# Patient Record
Sex: MALE | Race: WHITE
[De-identification: names, ages, dates, MRNs, and addresses within clinical notes are randomized per-mention and may not be internally consistent; named-entity substitution may affect disease eponyms.]

---

## 2018-01-02 ENCOUNTER — HOSPITAL ENCOUNTER (INPATIENT)
Dept: HOSPITAL 12 - SRC | Age: 18
LOS: 7 days | Discharge: HOME | DRG: 895 | End: 2018-01-09
Attending: INTERNAL MEDICINE | Admitting: INTERNAL MEDICINE
Payer: COMMERCIAL

## 2018-01-02 VITALS — HEIGHT: 73 IN | WEIGHT: 150 LBS | BODY MASS INDEX: 19.88 KG/M2

## 2018-01-02 VITALS — SYSTOLIC BLOOD PRESSURE: 137 MMHG | DIASTOLIC BLOOD PRESSURE: 75 MMHG

## 2018-01-02 DIAGNOSIS — F12.10: ICD-10-CM

## 2018-01-02 DIAGNOSIS — Z81.3: ICD-10-CM

## 2018-01-02 DIAGNOSIS — F17.210: ICD-10-CM

## 2018-01-02 DIAGNOSIS — Z91.89: ICD-10-CM

## 2018-01-02 DIAGNOSIS — F10.230: Primary | ICD-10-CM

## 2018-01-02 DIAGNOSIS — F15.10: ICD-10-CM

## 2018-01-02 DIAGNOSIS — F11.23: ICD-10-CM
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
## 2018-01-02 LAB
AMPHETAMINES UR QL SCN>1000 NG/ML: POSITIVE
BARBITURATES UR QL SCN: NEGATIVE
COCAINE UR QL SCN: NEGATIVE
OPIATES UR QL SCN: POSITIVE
PCP UR QL SCN>25 NG/ML: NEGATIVE
THC UR QL SCN>50 NG/ML: NEGATIVE

## 2018-01-02 PROCEDURE — HZ2ZZZZ DETOXIFICATION SERVICES FOR SUBSTANCE ABUSE TREATMENT: ICD-10-PCS | Performed by: INTERNAL MEDICINE

## 2018-01-02 PROCEDURE — A4663 DIALYSIS BLOOD PRESSURE CUFF: HCPCS

## 2018-01-02 PROCEDURE — G0480 DRUG TEST DEF 1-7 CLASSES: HCPCS

## 2018-01-02 NOTE — NUR
PRE-ADMISSION NOTE:

Patient assessed in intake office at 2100 on 01/02/2018.  Patient is ambulatory with steady 
gate, stable, AOx4, speech is soft and clear.  Patient states that he is "first time for 
Safety Detox from Alcohol, Opioid, and Methamphetamine".   Patient states that he" last used 
Alcohol/Vodka PO - 750 ml today, on 01/02/2018 @1700 ;Heroin via IV - 7 gram on 01/02/2018 
@1900, and Methamphetamine smoke - 0.5 grams today, on 01/02/2018 @1900".  COWS 9,CIWA 7.  
The patient reported the following symptoms of withdrawal: anxiety, agitation, nervousness, 
tremors, body  aches, diaphoresis, abdominal cramps, headache,  restless legs,  insomnia and 
 fatigue.  Patient denies SI/HI.  Breathing is even and unlabored.  Patient denies SOB and 
chest pain.  VS: T: 97.9; BP: 137/75; HR: 80; RR: 18; O2 SAT: 97%. Patient reports 
generalized body aches pain now: "6/10".  Patient reports NKA.  Patient placed on Full Code, 
Regular Diet, Fall and Seizures Precautions.  Patient instructed on unit protocol of vitals 
Q4H and COWS/CIWA assessments. Patient verbalized understanding and agreement. Patient also 
instructed on policy regarding destruction of any controlled  substances/prescriptions 
brought to facility, and handling of all medications.  Patient verbalized understanding and 
agreement.  Will complete admission assessment when patient is brought up to unit.

## 2018-01-02 NOTE — NUR
ADMISSION NOTE

New  patient is a 27 year old male admitted to [facility]  on 01/02/2018 @2110 
for medically supervised withdrawal from  Alcohol, Opioid, and Methamphetamine.  Patient 
reports NKA.  Patient placed on Full Code, Regular Diet, Fall and Seizures Precautions.  
Patient denies Seizures History.  Patient reports "no PCP" .  Pre-assessment completed in 
intake.  Patient provided UDS test at this time.  Patient is ambulatory with steady gate, 
stable, A&Ox4, speech is soft clear.  VS upon admission: T: 97.9; BP: 137/75; HR: 80; RR: 
18; O2 SAT: 97%. Patient reports generalized body aches pain now: "6/10".   Ht: 73 in; Wt: 
150 lb.  COWS 9, CIWA 7.  The patient presented with: anxiety, agitation, nervousness, 
tremors, sweating, headache, body aches, restless legs, insomnia and  fatigue.  Patient 
denies SI/HI.  Patient is cooperative and verbally appropriate.  Patient reports  the 
following substances use:

1."Alcohol: Vodka 750 ml five times a week since 2015.  Last used Alcohol/Vodka  ml 
today, on 01/02/2018 @1700  ".  

2."Heroin via IV  more than 7 grams every day grams every day since 2015.  Last dose taken  
- 7 gram today, on 01/02/2018 @1900".

3." Methamphetamine smoke 0.5 grams two times a week since 2015.  Last dosage  - 0.5 grams 
today, on 01/02/2018 @1900".  

Patient reports "smoking 20 cigarettes every day since 2000".  Written smoking cessation 
education provided.  Patient verbalized understanding.  Patient reports recently Patient 
states that he is "first  time for Safety Detox from Alcohol, Opioid, and Methamphetamine".  
 Patient reports that longest period of sobriety was from 2013 for 1,5 years to  2015".  
Patient denies a history of suicidal ideations.  Patient denies current  SI/HI.  Past 
Medical History: Anxiety, Depression.  Patient did not brought Home Medications.  Upon 
initial assessment, patient's Respirations unlabored and even.  Patient denies SOB and chest 
pain.  Lungs Sounds are clear bilaterally.  Bowel Sounds active in all x4 quadrants. Abdomen 
is soft and non-tender.  PERRLA,  brisk capillary refill,  equal and strong.  Skin is 
intact, warm and dry to touch.  Patient oriented to his room, Nurse Call Light, and Serenity 
Floor.  Encourage fluids as tolerated.  Encourage to attend activities groups.  All needs 
met.  Safety measures on place.  Call light within reach, bed in lowest position and locked, 
padded rails up bilaterally rails up bilaterally.  Patient admitted under the care doctor 
Juan Carlos Grimes MD.  Doctor Juan Carlos Grimes MD aware for patient condition.  Will 
continue to monitor closely.

## 2018-01-03 VITALS — SYSTOLIC BLOOD PRESSURE: 116 MMHG | DIASTOLIC BLOOD PRESSURE: 74 MMHG

## 2018-01-03 VITALS — SYSTOLIC BLOOD PRESSURE: 117 MMHG | DIASTOLIC BLOOD PRESSURE: 51 MMHG

## 2018-01-03 VITALS — DIASTOLIC BLOOD PRESSURE: 53 MMHG | SYSTOLIC BLOOD PRESSURE: 124 MMHG

## 2018-01-03 VITALS — DIASTOLIC BLOOD PRESSURE: 66 MMHG | SYSTOLIC BLOOD PRESSURE: 125 MMHG

## 2018-01-03 VITALS — DIASTOLIC BLOOD PRESSURE: 79 MMHG | SYSTOLIC BLOOD PRESSURE: 128 MMHG

## 2018-01-03 VITALS — SYSTOLIC BLOOD PRESSURE: 112 MMHG | DIASTOLIC BLOOD PRESSURE: 59 MMHG

## 2018-01-03 LAB
ALP SERPL-CCNC: 68 U/L (ref 50–136)
ALT SERPL W/O P-5'-P-CCNC: 25 U/L (ref 16–63)
AMYLASE SERPL-CCNC: 59 U/L (ref 25–115)
AST SERPL-CCNC: 18 U/L (ref 15–37)
BASOPHILS # BLD AUTO: 0 K/UL (ref 0–8)
BASOPHILS NFR BLD AUTO: 0.6 % (ref 0–2)
BILIRUB SERPL-MCNC: 0.7 MG/DL (ref 0.2–1)
BUN SERPL-MCNC: 10 MG/DL (ref 7–18)
CHLORIDE SERPL-SCNC: 106 MMOL/L (ref 98–107)
CO2 SERPL-SCNC: 31 MMOL/L (ref 21–32)
CREAT SERPL-MCNC: 0.8 MG/DL (ref 0.6–1.3)
EOSINOPHIL # BLD AUTO: 0.1 K/UL (ref 0–0.7)
EOSINOPHIL NFR BLD AUTO: 2.7 % (ref 0–7)
ETHANOL SERPL-MCNC: < 3 MG/DL (ref 0–0)
GLUCOSE SERPL-MCNC: 85 MG/DL (ref 74–106)
HCT VFR BLD AUTO: 37.7 % (ref 36.7–47.1)
HGB BLD-MCNC: 13.1 G/DL (ref 12.5–16.3)
LYMPHOCYTES # BLD AUTO: 1.7 K/UL (ref 20–40)
LYMPHOCYTES NFR BLD AUTO: 39.3 % (ref 20.5–51.5)
MAGNESIUM SERPL-MCNC: 1.8 MG/DL (ref 1.8–2.4)
MCH RBC QN AUTO: 28.9 UUG (ref 23.8–33.4)
MCHC RBC AUTO-ENTMCNC: 35 G/DL (ref 32.5–36.3)
MCV RBC AUTO: 83.2 FL (ref 73–96.2)
MONOCYTES # BLD AUTO: 0.3 K/UL (ref 2–10)
MONOCYTES NFR BLD AUTO: 8.1 % (ref 0–11)
NEUTROPHILS # BLD AUTO: 2.1 K/UL (ref 1.8–8.9)
NEUTROPHILS NFR BLD AUTO: 49.3 % (ref 38.5–71.5)
PLATELET # BLD AUTO: 256 K/UL (ref 152–348)
POTASSIUM SERPL-SCNC: 3.7 MMOL/L (ref 3.5–5.1)
RBC # BLD AUTO: 4.53 MIL/UL (ref 4.06–5.63)
WBC # BLD AUTO: 4.3 K/UL (ref 3.6–10.2)
WS STN SPEC: 7.9 G/DL (ref 6.4–8.2)

## 2018-01-03 RX ADMIN — BUPRENORPHINE HYDROCHLORIDE SCH MG: 2 TABLET SUBLINGUAL at 17:13

## 2018-01-03 RX ADMIN — LORAZEPAM SCH MG: 1 TABLET ORAL at 17:12

## 2018-01-03 RX ADMIN — FOLIC ACID SCH MG: 1 TABLET ORAL at 08:44

## 2018-01-03 RX ADMIN — LORAZEPAM SCH MG: 1 TABLET ORAL at 12:45

## 2018-01-03 RX ADMIN — BUPRENORPHINE HYDROCHLORIDE SCH MG: 2 TABLET SUBLINGUAL at 08:44

## 2018-01-03 RX ADMIN — BUPRENORPHINE HYDROCHLORIDE SCH MG: 2 TABLET SUBLINGUAL at 20:15

## 2018-01-03 RX ADMIN — LORAZEPAM SCH MG: 1 TABLET ORAL at 08:44

## 2018-01-03 RX ADMIN — Medication SCH MG: at 08:45

## 2018-01-03 RX ADMIN — LORAZEPAM SCH MG: 1 TABLET ORAL at 20:15

## 2018-01-03 RX ADMIN — BUPRENORPHINE HYDROCHLORIDE SCH MG: 2 TABLET SUBLINGUAL at 12:46

## 2018-01-03 RX ADMIN — THERA TABS SCH UDTAB: TAB at 08:44

## 2018-01-03 RX ADMIN — GABAPENTIN SCH MG: 300 CAPSULE ORAL at 20:15

## 2018-01-03 NOTE — NUR
RE-ASSESSMENT

Patient is sleeping.  Respirations even and unlabored.  RR 15.  PRN Benadryl 50 mg 1 capsule 
PO administrated for insomnia @2014 was effective.  All needs met. Safety measures on place. 
Call light within reach, bed in lowest position and locked, bed rails up bilaterally.  Will 
continue to monitor closely.

## 2018-01-03 NOTE — NUR
Received pt in bed sleeping, no s/s of distress, sleep 8 hours, cow 8, and ciwa 5, will 
check on him later.

## 2018-01-03 NOTE — NUR
END OF SHIFT NOTE:

Patient is a 27 year old male admitted for medically supervised withdrawal from  Alcohol, 
Opioid, and Methamphetamine.  5 day Ativan and 5 Day Subutex Taper will starting today, 
01/03/2018 @0900, as ordered.  Patient reports NKA, is on Full Code, Regular Diet, Fall and 
Seizures Precautions.  Patients denies Seizures Hx  r/t withdrawal from substances.  Past 
Medical History: Anxiety, Depression.  Last  COWS 8, CIWA 5 @0400.  Patient c/o anxiety, 
agitation, nervousness, tremors, sweating, body aches, restless legs,  insomnia and  
fatigue.  Patient denies SI/HI.  Last VS @0400: T: 98.3, HR: 65, RR: 16 , RA O2SAT 100%, BP: 
116/74, pain level: "0/10".  Respirations unlabored and even.  Skin is intact, warm and dry 
to touch.  Encourage fluids as tolerated.  Encourage to attend activities groups.  No PRN 
Medications administrated during my shift.  Patient slept 8 hours, intake 200 ml, voided x1. 
 All needs met.  Safety measures on place.  Call light within reach, bed in lowest position 
and locked, padded rails up bilaterally.  Patient endorsed to day shift nurse.  Report 
given.

## 2018-01-03 NOTE — NUR
Pt in room/bed, awake,alert, here for alcohol, heroin, and methamphetamine use, no c/o 
discomfrot at this time, complient with meds, encourage pos fluids, also to attend groups, 
no distress.

## 2018-01-03 NOTE — NUR
START OF SHIFT NOTE:

Patient is a 27 year old male admitted for medically supervised withdrawal from Alcohol, 
Opioid, and Methamphetamine, continues  5 Day Ativan and 5 Day Subutex taper.  He is 
tolerated well without ASE.  Patient remains compliant with medications and diet regime. 
Patient reports NKA, is on Full Code, Regular Diet, Fall and Seizures Precautions.  Patients 
denies Seizures Hx.  Patient is alert and oriented x4.  COWS 9, CIWA 7. Patient c/o anxiety, 
agitation, nervousness, tremors, sweating, body aches, restless legs, insomnia and  fatigue. 
 Patient denies SI/HI.  VSWNL.  Respirations are unlabored and even.  Lungs Sounds are clear 
throughout.   Patient denies cough, SOB and chest pain.  Heart rate is regular.  Abdomen is 
soft and non-tender.  Bowel Sounds are active in all four quadrants.  Skin is  intact, warm 
and dry to touch.  Encouraged fluids as tolerated. Encouraged to attend group activities.  
Safety measures in place: Call light within reach, bed is locked and lowest positions, 
padded bed rails up x2.  Patient endorsed by day shift nurse.  Report received.  Will 
continue to monitor closely.

## 2018-01-03 NOTE — NUR
PRN BENADRYL 50 MG 1 CAPSULE PO ADMINISTRATION

PRN Benadryl 50 mg 1 capsule PO administrated for insomnia with full glass of water as 
ordered.  Patient tolerated well.  All needs met.  Safety measures on place.  Call light 
within reach, bed in lowest position and locked, padded rails up x2.  Will continue to 
monitor closely.

## 2018-01-04 VITALS — SYSTOLIC BLOOD PRESSURE: 116 MMHG | DIASTOLIC BLOOD PRESSURE: 75 MMHG

## 2018-01-04 VITALS — DIASTOLIC BLOOD PRESSURE: 55 MMHG | SYSTOLIC BLOOD PRESSURE: 118 MMHG

## 2018-01-04 VITALS — SYSTOLIC BLOOD PRESSURE: 125 MMHG | DIASTOLIC BLOOD PRESSURE: 60 MMHG

## 2018-01-04 VITALS — SYSTOLIC BLOOD PRESSURE: 105 MMHG | DIASTOLIC BLOOD PRESSURE: 54 MMHG

## 2018-01-04 VITALS — SYSTOLIC BLOOD PRESSURE: 119 MMHG | DIASTOLIC BLOOD PRESSURE: 55 MMHG

## 2018-01-04 VITALS — SYSTOLIC BLOOD PRESSURE: 125 MMHG | DIASTOLIC BLOOD PRESSURE: 66 MMHG

## 2018-01-04 PROCEDURE — HZ31ZZZ INDIVIDUAL COUNSELING FOR SUBSTANCE ABUSE TREATMENT, BEHAVIORAL: ICD-10-PCS

## 2018-01-04 RX ADMIN — Medication SCH MG: at 08:58

## 2018-01-04 RX ADMIN — LORAZEPAM SCH MG: 1 TABLET ORAL at 15:14

## 2018-01-04 RX ADMIN — BUPRENORPHINE HYDROCHLORIDE SCH MG: 2 TABLET SUBLINGUAL at 08:58

## 2018-01-04 RX ADMIN — LORAZEPAM SCH MG: 1 TABLET ORAL at 20:54

## 2018-01-04 RX ADMIN — BACLOFEN SCH MG: 10 TABLET ORAL at 15:14

## 2018-01-04 RX ADMIN — BUPRENORPHINE HYDROCHLORIDE SCH MG: 2 TABLET SUBLINGUAL at 15:14

## 2018-01-04 RX ADMIN — CLONIDINE HYDROCHLORIDE SCH MG: 0.1 TABLET ORAL at 20:54

## 2018-01-04 RX ADMIN — GABAPENTIN SCH MG: 300 CAPSULE ORAL at 20:54

## 2018-01-04 RX ADMIN — GABAPENTIN SCH MG: 300 CAPSULE ORAL at 08:58

## 2018-01-04 RX ADMIN — THERA TABS SCH UDTAB: TAB at 08:58

## 2018-01-04 RX ADMIN — BACLOFEN SCH MG: 10 TABLET ORAL at 20:54

## 2018-01-04 RX ADMIN — NAPROXEN SCH MG: 500 TABLET ORAL at 20:54

## 2018-01-04 RX ADMIN — BUPRENORPHINE HYDROCHLORIDE SCH MG: 2 TABLET SUBLINGUAL at 20:54

## 2018-01-04 RX ADMIN — GABAPENTIN SCH MG: 300 CAPSULE ORAL at 15:14

## 2018-01-04 RX ADMIN — FOLIC ACID SCH MG: 1 TABLET ORAL at 08:58

## 2018-01-04 RX ADMIN — LORAZEPAM SCH MG: 1 TABLET ORAL at 08:58

## 2018-01-04 NOTE — NUR
START OF SHIFT



Pt is a 18 y/o male admitted on 01/02/17 for ETOH, opiate and meth dependence. Pt was 
dependent on vodka, heroin IV and meth. Pt is full code, regular diet, NKA and on 
fall/seizure precautions. Pt reports PMH of anxiety and depression, denies hx of seizures. 
Pt is on a 5 day Ativan and 5 day Subutex taper that started on 01/03/17, tolerating well. 
Upon assessment pt laying in bed with eyes closed. Upon fully awakening, pt presents with 
anxiety, body aches, intermittent headache, agitation, sweats, fatigue, difficulty sleeping, 
decreased appetite, dysphoria and anhedonia. Respirations even and unlabored. Denies N/V/D. 
Denies A/V hallucinations. Denies chest pain or SOB. Medications due. Safety measures in 
place. Call light within reach. Will continue to monitor.

-------------------------------------------------------------------------------

Addendum: 01/05/18 at 0730 by ESEQUIEL CONCEPCION RN

-------------------------------------------------------------------------------

admitted 01/02/18, taper started 01/03/18**

## 2018-01-04 NOTE — NUR
PRN BENADRYL ADMINISTRATION



Pt requests sleep aid. Safety measures in place. Call light within reach. Will continue to 
monitor.

## 2018-01-04 NOTE — NUR
END OF SHIFT NOTE:

Patient is a 27 year old male admitted for Alcohol, Opioid, and Methamphetamine dependence, 
continues  5 day Ativan and 5 Day Subutex Taper which tolerated well without ASE.  Patient 
reports NKA, is on Full Code, Regular Diet, Fall and Seizures Precautions.  Patients denies 
Seizures Hx.  Last  COWS 9, CIWA 5 @0400.  Patient c/o anxiety, agitation, nervousness, 
tremors, sweating, body aches, restless legs,  insomnia and  fatigue.  Patient denies SI/HI. 
 Last VS @0400: T: 98.1, HR: 86, RR: 16, RA O2SAT 97%, BP: 105/54, pain level: "8/10".  
Respirations unlabored and even.  Skin is intact, warm and dry to touch.  Encourage fluids 
as tolerated.  Encourage to attend activities groups.  PRN Benadryl 50 mg 1 capsule PO 
administrated for insomnia @2014 was effective.   Patient slept 9 hours, intake 1,184 ml, 
voided x2.   All needs met.  Safety measures on place.  Call light within reach, bed in 
lowest position and locked, padded rails up bilaterally.  Patient endorsed to day shift 
nurse.  Report given.

## 2018-01-04 NOTE — NUR
Endorsement received from nightshift nurse. Pt is a 18 y/o male admitted for Heroin and 
Alcohol dependence. Pt has been placed on a 5 day Ativan and 5 day Subutex taper. PT is 
tolerating both tapers and moderately withdrawing AEB CIWA 5, COWS 5 at 1600. Pt did not 
receive any PRN medications. Educated pt on diet and medication regimen. VS WNL. Full Code. 
PT is alert and oriented x4. Pt is in STABLE condition at this time. Remains compliant with 
medication and diet regimen. All needs have been met, All safety measures in place per 
hospital policy. Bed in lowest position, side rails up x2, call-light within reach. Will 
continue to monitor

## 2018-01-04 NOTE — NUR
Start of Shift



Endorsement received from nightshift nurse. Pt is a 16 y/o male admitted for Heroin and 
Alcohol dependence. Pt has been placed on a 5 day Ativan and 5 day Subutex taper. PT is 
tolerating both tapers and moderately withdrawing AEB CIWA 5, COWS 9 at 0400. Pt received 
PRN Benadryl and reports sleeping 9 hours. VS WNL. Full Code. PT is alert and oriented x4. 
Pt is in STABLE condition at this time. Remains compliant with medication and diet regimen. 
All needs have been met, All safety measures in place per hospital policy. Bed in lowest 
position, side rails up x2, call-light within reach. Will continue to monitor

## 2018-01-04 NOTE — NUR
PRN BENADRYL REASSESSMENT



Pt is laying in bed with eyes closed. Respirations 16, even and unlabored. Safety measures 
in place. Call light within reach. Will continue to monitor.

## 2018-01-04 NOTE — NUR
Therapist prompted client about group times. Client stated he will attend all groups if he 
feels well.

## 2018-01-05 VITALS — SYSTOLIC BLOOD PRESSURE: 105 MMHG | DIASTOLIC BLOOD PRESSURE: 53 MMHG

## 2018-01-05 VITALS — DIASTOLIC BLOOD PRESSURE: 60 MMHG | SYSTOLIC BLOOD PRESSURE: 102 MMHG

## 2018-01-05 VITALS — DIASTOLIC BLOOD PRESSURE: 53 MMHG | SYSTOLIC BLOOD PRESSURE: 127 MMHG

## 2018-01-05 VITALS — DIASTOLIC BLOOD PRESSURE: 71 MMHG | SYSTOLIC BLOOD PRESSURE: 114 MMHG

## 2018-01-05 VITALS — SYSTOLIC BLOOD PRESSURE: 127 MMHG | DIASTOLIC BLOOD PRESSURE: 58 MMHG

## 2018-01-05 VITALS — DIASTOLIC BLOOD PRESSURE: 59 MMHG | SYSTOLIC BLOOD PRESSURE: 115 MMHG

## 2018-01-05 RX ADMIN — FOLIC ACID SCH MG: 1 TABLET ORAL at 09:15

## 2018-01-05 RX ADMIN — NAPROXEN SCH MG: 500 TABLET ORAL at 09:16

## 2018-01-05 RX ADMIN — FAMOTIDINE SCH MG: 20 TABLET, FILM COATED ORAL at 09:16

## 2018-01-05 RX ADMIN — Medication SCH MG: at 09:15

## 2018-01-05 RX ADMIN — BACLOFEN SCH MG: 20 TABLET ORAL at 15:00

## 2018-01-05 RX ADMIN — BUPRENORPHINE HYDROCHLORIDE SCH MG: 2 TABLET SUBLINGUAL at 21:37

## 2018-01-05 RX ADMIN — GABAPENTIN SCH MG: 300 CAPSULE ORAL at 15:00

## 2018-01-05 RX ADMIN — ONDANSETRON PRN MG: 4 TABLET, ORALLY DISINTEGRATING ORAL at 12:25

## 2018-01-05 RX ADMIN — BACLOFEN SCH MG: 10 TABLET ORAL at 09:16

## 2018-01-05 RX ADMIN — ONDANSETRON PRN MG: 4 TABLET, ORALLY DISINTEGRATING ORAL at 23:10

## 2018-01-05 RX ADMIN — BUPRENORPHINE HYDROCHLORIDE SCH MG: 2 TABLET SUBLINGUAL at 14:51

## 2018-01-05 RX ADMIN — NAPROXEN SCH MG: 500 TABLET ORAL at 21:47

## 2018-01-05 RX ADMIN — LORAZEPAM SCH MG: 1 TABLET ORAL at 09:16

## 2018-01-05 RX ADMIN — CLONIDINE HYDROCHLORIDE SCH MG: 0.1 TABLET ORAL at 09:16

## 2018-01-05 RX ADMIN — CLONIDINE HYDROCHLORIDE SCH MG: 0.1 TABLET ORAL at 21:37

## 2018-01-05 RX ADMIN — GABAPENTIN SCH MG: 300 CAPSULE ORAL at 21:37

## 2018-01-05 RX ADMIN — CLONIDINE HYDROCHLORIDE SCH MG: 0.1 TABLET ORAL at 15:09

## 2018-01-05 RX ADMIN — GABAPENTIN SCH MG: 300 CAPSULE ORAL at 09:16

## 2018-01-05 RX ADMIN — LORAZEPAM SCH MG: 1 TABLET ORAL at 13:01

## 2018-01-05 RX ADMIN — LORAZEPAM SCH MG: 1 TABLET ORAL at 17:17

## 2018-01-05 RX ADMIN — BACLOFEN SCH MG: 20 TABLET ORAL at 21:37

## 2018-01-05 RX ADMIN — THERA TABS SCH UDTAB: TAB at 09:16

## 2018-01-05 NOTE — NUR
Start of Shift



Endorsement received from nightshift nurse. Pt is a 16 y/o male admitted for Heroin and 
Alcohol dependence. Pt has been placed on a 5 day Ativan and 5 day Subutex taper. PT is 
tolerating both tapers and moderately withdrawing AEB CIWA 6, COWS 5 at 0400. Pt received 
PRN Benadryl and reports sleeping 7 hours. VS WNL. Full Code. PT is alert and oriented x4. 
Pt is in STABLE condition at this time. Remains compliant with medication and diet regimen. 
All needs have been met, All safety measures in place per hospital policy. Bed in lowest 
position, side rails up x2, call-light within reach. Will continue to monitor

## 2018-01-05 NOTE — NUR
COWS/CIWA DEFERRED



Pt is laying in bed with eyes closed, COWS/CIWA deferred, to be assessed when pt is awake 
per orders. Respirations 16, even and unlabored. Safety measures in place. Call light within 
reach. Will continue to monitor.

## 2018-01-05 NOTE — NUR
END OF SHIFT



Pt is a 18 y/o male admitted on 01/02/17 for ETOH, opiate and meth dependence. Pt was 
dependent on vodka, heroin IV and meth. Pt is full code, regular diet, NKA and on 
fall/seizure precautions. Pt reports PMH of anxiety and depression, denies hx of seizures. 
Pt is on a 5 day Ativan and 5 day Subutex taper that started on 01/03/17, tolerating well. 
Pt presented with anxiety, body aches, intermittent headache, agitation, sweats, fatigue, 
difficulty sleeping, decreased appetite, dysphoria and anhedonia. Scheduled medications and 
PRN Benadryl administered, effective in S/S of withdrawal as verbalized by pt. Last COWS 5 
and CIWA 6 at 2000. Pt slept 9 hours. Intake 350 ml, void x 1, stool x 0. Safety measures in 
place. Call light within reach. Pts needs have been met. Endorsed to day shift nurse.

-------------------------------------------------------------------------------

Addendum: 01/05/18 at 0730 by ESEQUIEL CONCEPCION RN

-------------------------------------------------------------------------------

TAPER STARTED 01/03/18**

-------------------------------------------------------------------------------

Addendum: 01/05/18 at 0730 by ESEQUIEL CONCEPCION RN

-------------------------------------------------------------------------------

ADMITTED ON 01/02/18**

## 2018-01-05 NOTE — NUR
PRN ZOFRAN ODT ADMINISTRATION



Pt presents with nausea and one episode of vomiting. Upon assessment pt reports after 
vomiting his nausea is mild but consistent. Safety measures in place. Call light within 
reach. Will continue to monitor.

## 2018-01-05 NOTE — NUR
End of Shift



Endorsement given to nightshift nurse. Pt is a 16 y/o male admitted for Heroin and Alcohol 
dependence. Pt has been placed on a 5 day Ativan and 5 day Subutex taper. PT is tolerating 
both tapers and moderately withdrawing AEB CIWA 5, COWS 4 at 1600. Pt received PRN Zofran 
for nausea and vomiting, medication was effective. Educated pt on diet regimen. Intake: 
2000ml, Void x2, BM x0. VS WNL. Full Code. PT is alert and oriented x4. Pt is in STABLE 
condition at this time. Remains compliant with medication and diet regimen. All needs have 
been met, All safety measures in place per hospital policy. Bed in lowest position, side 
rails up x2, call-light within reach. Will continue to monitor

## 2018-01-05 NOTE — NUR
PRN ZOFRAN ODT REASSESSMENT



Pt reports nausea has improved, no further episodes of vomiting, Zofran effective. Safety 
measures in place. Call light within reach. Will continue to monitor.

## 2018-01-05 NOTE — NUR
START OF SHIFT



Pt is a 16 y/o male admitted on 01/02/18 for ETOH, opiate and meth dependence. Pt was 
dependent on vodka, heroin IV and meth. Pt is full code, regular diet, NKA and on 
fall/seizure precautions. Pt reports PMH of anxiety and depression, denies hx of seizures. 
Pt is on a 5 day Ativan and 5 day Subutex taper that started on 01/03/18, tolerating well. 
Upon assessment pt laying in bed with eyes closed. Upon fully awakening, pt presents with 
anxiety, intermittent body aches, intermittent headache, sweats, fatigue, dysphoria and 
anhedonia. Respirations even and unlabored. Denies N/V/D. Denies A/V hallucinations. Denies 
chest pain or SOB. Medications due. Safety measures in place. Call light within reach. Will 
continue to monitor.

## 2018-01-06 VITALS — DIASTOLIC BLOOD PRESSURE: 70 MMHG | SYSTOLIC BLOOD PRESSURE: 139 MMHG

## 2018-01-06 VITALS — DIASTOLIC BLOOD PRESSURE: 65 MMHG | SYSTOLIC BLOOD PRESSURE: 116 MMHG

## 2018-01-06 VITALS — SYSTOLIC BLOOD PRESSURE: 112 MMHG | DIASTOLIC BLOOD PRESSURE: 69 MMHG

## 2018-01-06 VITALS — DIASTOLIC BLOOD PRESSURE: 65 MMHG | SYSTOLIC BLOOD PRESSURE: 118 MMHG

## 2018-01-06 RX ADMIN — BACLOFEN SCH MG: 20 TABLET ORAL at 21:16

## 2018-01-06 RX ADMIN — DICYCLOMINE HYDROCHLORIDE SCH MG: 20 TABLET ORAL at 21:15

## 2018-01-06 RX ADMIN — BUPRENORPHINE HYDROCHLORIDE SCH MG: 2 TABLET SUBLINGUAL at 15:01

## 2018-01-06 RX ADMIN — BUPRENORPHINE HYDROCHLORIDE SCH MG: 2 TABLET SUBLINGUAL at 21:16

## 2018-01-06 RX ADMIN — NAPROXEN SCH MG: 500 TABLET ORAL at 09:10

## 2018-01-06 RX ADMIN — CLONIDINE HYDROCHLORIDE SCH MG: 0.1 TABLET ORAL at 15:01

## 2018-01-06 RX ADMIN — BUPRENORPHINE HYDROCHLORIDE SCH MG: 2 TABLET SUBLINGUAL at 09:11

## 2018-01-06 RX ADMIN — NAPROXEN SCH MG: 500 TABLET ORAL at 21:16

## 2018-01-06 RX ADMIN — GABAPENTIN SCH MG: 300 CAPSULE ORAL at 09:11

## 2018-01-06 RX ADMIN — FAMOTIDINE SCH MG: 20 TABLET, FILM COATED ORAL at 09:10

## 2018-01-06 RX ADMIN — THERA TABS SCH UDTAB: TAB at 09:10

## 2018-01-06 RX ADMIN — LORAZEPAM SCH MG: 1 TABLET ORAL at 21:15

## 2018-01-06 RX ADMIN — BACLOFEN SCH MG: 20 TABLET ORAL at 09:11

## 2018-01-06 RX ADMIN — LORAZEPAM SCH MG: 1 TABLET ORAL at 09:10

## 2018-01-06 RX ADMIN — LORAZEPAM SCH MG: 1 TABLET ORAL at 15:01

## 2018-01-06 RX ADMIN — CLONIDINE HYDROCHLORIDE SCH MG: 0.1 TABLET ORAL at 21:16

## 2018-01-06 RX ADMIN — CLONIDINE HYDROCHLORIDE SCH MG: 0.1 TABLET ORAL at 09:10

## 2018-01-06 RX ADMIN — Medication SCH MG: at 09:10

## 2018-01-06 RX ADMIN — FOLIC ACID SCH MG: 1 TABLET ORAL at 09:10

## 2018-01-06 RX ADMIN — BACLOFEN SCH MG: 20 TABLET ORAL at 15:00

## 2018-01-06 RX ADMIN — GABAPENTIN SCH MG: 300 CAPSULE ORAL at 21:16

## 2018-01-06 RX ADMIN — GABAPENTIN SCH MG: 300 CAPSULE ORAL at 15:01

## 2018-01-06 NOTE — NUR
END OF SHIFT



Pt is a 16 y/o male admitted on 01/02/18 for ETOH, opiate and meth dependence. Pt was 
dependent on vodka, heroin IV and meth. Pt is full code, regular diet, NKA and on 
fall/seizure precautions. Pt reports PMH of anxiety and depression, denies hx of seizures. 
Pt is on a 5 day Ativan and 5 day Subutex taper that started on 01/03/18, tolerating well. 
Pt presented with anxiety, intermittent body aches, intermittent headache, nausea, one 
episode of vomiting, sweats, fatigue, dysphoria and anhedonia. Scheduled medications and PRN 
Zofran odt administered, effective in S/S of withdrawal as verbalized by pt. Last COWS 6 and 
CIWA 8 at 2000. Pt slept 8 hours. Intake 5600 ml, void x 2, stool x 0. Vitals WNL. Safety 
measures in place. Call light within reach. Pts needs have been met. Endorsed to day shift 
nurse.

## 2018-01-06 NOTE — NUR
COWS/CIWA DEFERRED AND VITALS REFUSED



Pt is laying in bed with eyes closed, COWS/CIWA deferred, to be assessed when pt is awake 
per orders. Vitals refused. Respirations 17, even and unlabored. Safety measures in place. 
Call light within reach. Will continue to monitor.

## 2018-01-06 NOTE — NUR
Start of Shift 

Patient Received. Patient is in his room awake, alert and verbally responsive. Breathing 
even and non labored.  Patient is a 17 year old male admitted on 1/2/18 for ETOH and Opiate 
Dependence and is currently receiving a 5 day Ativan and 5 day Subutex taper. No known 
allergies, Full Code, Regular Diet, placed on fall and seizure precautions and skin intact. 
Past medical history noted as anxiety and depression. Per endorsement, No PRN medications 
administered. Last noted COWS 4 and CIWA 4. All needs attended to promptly. Will continue to 
monitor.

## 2018-01-06 NOTE — NUR
End of Shift



Endorsement given to nightshift nurse. Pt is a 18 y/o male admitted for Heroin and Alcohol 
dependence. Pt has been placed on a 5 day Ativan and 5 day Subutex taper. PT is tolerating 
both tapers and moderately withdrawing AEB CIWA 4, COWS 4 at 1600. Pt received PRN Zofran 
for nausea and vomiting, medication was effective. Educated pt on diet regimen. Intake: 
22ml, Void x5, BM x1. VS WNL. Full Code. PT is alert and oriented x4. Pt is in STABLE 
condition at this time. Remains compliant with medication and diet regimen. All needs have 
been met, All safety measures in place per hospital policy. Bed in lowest position, side 
rails up x2, call-light within reach. Will continue to monitor

## 2018-01-06 NOTE — NUR
Start of Shift



Endorsement received from nightshift nurse. Pt is a 16 y/o male admitted for Heroin and 
Alcohol dependence. Pt has been placed on a 5 day Ativan and 5 day Subutex taper. PT is 
tolerating both tapers and moderately withdrawing AEB CIWA 6, COWS 8 at 0400. Pt received 
PRN Zofran for nausea, medications was effective. Pt reports sleeping 9 hours. VS WNL. Full 
Code. PT is alert and oriented x4. Pt is in STABLE condition at this time. Remains compliant 
with medication and diet regimen. All needs have been met, All safety measures in place per 
hospital policy. Bed in lowest position, side rails up x2, call-light within reach. Will 
continue to monitor

## 2018-01-07 VITALS — SYSTOLIC BLOOD PRESSURE: 103 MMHG | DIASTOLIC BLOOD PRESSURE: 68 MMHG

## 2018-01-07 VITALS — DIASTOLIC BLOOD PRESSURE: 73 MMHG | SYSTOLIC BLOOD PRESSURE: 124 MMHG

## 2018-01-07 VITALS — DIASTOLIC BLOOD PRESSURE: 60 MMHG | SYSTOLIC BLOOD PRESSURE: 108 MMHG

## 2018-01-07 VITALS — SYSTOLIC BLOOD PRESSURE: 98 MMHG | DIASTOLIC BLOOD PRESSURE: 68 MMHG

## 2018-01-07 RX ADMIN — LORAZEPAM SCH MG: 1 TABLET ORAL at 21:31

## 2018-01-07 RX ADMIN — BACLOFEN SCH MG: 20 TABLET ORAL at 09:06

## 2018-01-07 RX ADMIN — BACLOFEN SCH MG: 20 TABLET ORAL at 21:30

## 2018-01-07 RX ADMIN — NAPROXEN SCH MG: 500 TABLET ORAL at 09:07

## 2018-01-07 RX ADMIN — BUPRENORPHINE HYDROCHLORIDE SCH MG: 2 TABLET SUBLINGUAL at 21:30

## 2018-01-07 RX ADMIN — FOLIC ACID SCH MG: 1 TABLET ORAL at 09:07

## 2018-01-07 RX ADMIN — NAPROXEN SCH MG: 500 TABLET ORAL at 21:31

## 2018-01-07 RX ADMIN — CLONIDINE HYDROCHLORIDE SCH MG: 0.1 TABLET ORAL at 14:08

## 2018-01-07 RX ADMIN — CLONIDINE HYDROCHLORIDE SCH MG: 0.1 TABLET ORAL at 21:00

## 2018-01-07 RX ADMIN — CLONIDINE HYDROCHLORIDE SCH MG: 0.1 TABLET ORAL at 09:07

## 2018-01-07 RX ADMIN — DICYCLOMINE HYDROCHLORIDE SCH MG: 20 TABLET ORAL at 09:00

## 2018-01-07 RX ADMIN — GABAPENTIN SCH MG: 300 CAPSULE ORAL at 21:30

## 2018-01-07 RX ADMIN — DICYCLOMINE HYDROCHLORIDE SCH MG: 20 TABLET ORAL at 21:30

## 2018-01-07 RX ADMIN — THERA TABS SCH UDTAB: TAB at 09:07

## 2018-01-07 RX ADMIN — FAMOTIDINE SCH MG: 20 TABLET, FILM COATED ORAL at 09:07

## 2018-01-07 RX ADMIN — GABAPENTIN SCH MG: 300 CAPSULE ORAL at 14:08

## 2018-01-07 RX ADMIN — GABAPENTIN SCH MG: 300 CAPSULE ORAL at 09:07

## 2018-01-07 RX ADMIN — LORAZEPAM SCH MG: 1 TABLET ORAL at 09:07

## 2018-01-07 RX ADMIN — BACLOFEN SCH MG: 20 TABLET ORAL at 14:08

## 2018-01-07 RX ADMIN — DICYCLOMINE HYDROCHLORIDE SCH MG: 20 TABLET ORAL at 14:08

## 2018-01-07 RX ADMIN — Medication SCH MG: at 09:07

## 2018-01-07 RX ADMIN — BUPRENORPHINE HYDROCHLORIDE SCH MG: 2 TABLET SUBLINGUAL at 09:06

## 2018-01-07 NOTE — NUR
PRN BENADRYL ADMINISTRATION



Pt requests sleep aid, appears restless. Safety measures in place. Call light within reach. 
Will continue to monitor.

## 2018-01-07 NOTE — NUR
START OF SHIFT



Pt is a 16 y/o male admitted on 01/02/18 for ETOH, opiate and meth dependence. Pt was 
dependent on vodka, heroin IV and meth. Pt is full code, regular diet, NKA and on 
fall/seizure precautions. Pt reports PMH of anxiety and depression, denies hx of seizures. 
Pt is on a 5 day Ativan and 5 day Subutex taper that started on 01/03/18, tolerating well. 
Pt presents with anxiety, intermittent sweats, intermittent headache, restlessness, fatigue, 
dysphoria and anhedonia. Respirations even and unlabored. Denies N/V/D. Denies A/V 
hallucinations. Denies chest pain or SOB. Medications due. Safety measures in place. Call 
light within reach. Will continue to monitor.

## 2018-01-07 NOTE — NUR
End of Shift 

Patient is in bed sleeping. Breathing even and non labored. No signs of pain or discomfort 
noted. Patient was admitted for Benzo and Opiate and continues on a modified 4 day Subutex 
and modified 4 day Ativan tapers. No PRN medications administered. Last noted COWS 2 and 
CIWA 2. Will continue to monitor.


-------------------------------------------------------------------------------

Addendum: 01/07/18 at 0705 by YESICA STERLING LVN

-------------------------------------------------------------------------------

ENTERED IN ERROR

## 2018-01-07 NOTE — NUR
End of Shift 

Patient is in bed sleeping. Breathing even and non labored. Patient is a 17 year old male 
admitted for ETOH and Opiate Dependence and is currently receiving a 5 day Ativan and 5 day 
Subutex taper. No PRN medications administered. Last noted COWS 6 and CIWA 4. All needs 
attended to promptly. Will endorse to continue plan of care as ordered.

## 2018-01-07 NOTE — NUR
SCHEDULED CLONIDINE 0.1 MG HELD



/73 HR 62, orders to hold if HR < 70. Safety measures in place. Call light within 
reach. Will continue to monitor.

## 2018-01-07 NOTE — NUR
End of shift note;



Patient is AOX4. Patient remained compliant with treatment plan and medication 
regime.Medications noted to be effective in reducing withdrawal symptoms. Patient 
participated in group therapy and activities. All safety measures secured. Met all needs.

## 2018-01-07 NOTE — NUR
Vitals Refused

Patient is noted in bed sleeping. Breathing even and non labored. Patient refused vitals to 
be rendered. All needs attended to promptly. Will continue to monitor.

## 2018-01-07 NOTE — NUR
Vitals Refused

Patient is noted in bed sleeping. Breathing even and non labored. Patient refused vitals to 
be rendered. All needs attended to promptly. Will continue to monitor.

-------------------------------------------------------------------------------

Addendum: 01/07/18 at 0138 by YESICA STERLING LVN

-------------------------------------------------------------------------------

Amended: Links added.

## 2018-01-07 NOTE — NUR
Start of shift note;



Received report from night nurse. Patient is a 17 year old male admitted on 1/2/18 for ETOH/ 
Opiate dependence. Patient was placed on 5 day Ativan taper and 5 day Subutex taper, no 
adverse reaction noted. Patient had an uneventful night. Patient slept for 5 hours. All 
safety measures secured. Will continue to monitor patient.

## 2018-01-08 VITALS — DIASTOLIC BLOOD PRESSURE: 62 MMHG | SYSTOLIC BLOOD PRESSURE: 126 MMHG

## 2018-01-08 VITALS — SYSTOLIC BLOOD PRESSURE: 114 MMHG | DIASTOLIC BLOOD PRESSURE: 60 MMHG

## 2018-01-08 VITALS — DIASTOLIC BLOOD PRESSURE: 68 MMHG | SYSTOLIC BLOOD PRESSURE: 126 MMHG

## 2018-01-08 VITALS — DIASTOLIC BLOOD PRESSURE: 82 MMHG | SYSTOLIC BLOOD PRESSURE: 142 MMHG

## 2018-01-08 VITALS — DIASTOLIC BLOOD PRESSURE: 61 MMHG | SYSTOLIC BLOOD PRESSURE: 130 MMHG

## 2018-01-08 RX ADMIN — BACLOFEN SCH MG: 20 TABLET ORAL at 15:00

## 2018-01-08 RX ADMIN — DICYCLOMINE HYDROCHLORIDE SCH MG: 20 TABLET ORAL at 21:08

## 2018-01-08 RX ADMIN — Medication SCH MG: at 09:29

## 2018-01-08 RX ADMIN — DICYCLOMINE HYDROCHLORIDE SCH MG: 20 TABLET ORAL at 09:30

## 2018-01-08 RX ADMIN — BACLOFEN SCH MG: 20 TABLET ORAL at 21:08

## 2018-01-08 RX ADMIN — DICYCLOMINE HYDROCHLORIDE SCH MG: 20 TABLET ORAL at 14:59

## 2018-01-08 RX ADMIN — NAPROXEN SCH MG: 500 TABLET ORAL at 21:08

## 2018-01-08 RX ADMIN — CLONIDINE HYDROCHLORIDE SCH MG: 0.1 TABLET ORAL at 09:34

## 2018-01-08 RX ADMIN — BACLOFEN SCH MG: 20 TABLET ORAL at 09:30

## 2018-01-08 RX ADMIN — THERA TABS SCH UDTAB: TAB at 09:30

## 2018-01-08 RX ADMIN — FOLIC ACID SCH MG: 1 TABLET ORAL at 09:28

## 2018-01-08 RX ADMIN — GABAPENTIN SCH MG: 300 CAPSULE ORAL at 14:59

## 2018-01-08 RX ADMIN — GABAPENTIN SCH MG: 300 CAPSULE ORAL at 09:31

## 2018-01-08 RX ADMIN — GABAPENTIN SCH MG: 300 CAPSULE ORAL at 21:08

## 2018-01-08 RX ADMIN — CLONIDINE HYDROCHLORIDE SCH MG: 0.1 TABLET ORAL at 21:08

## 2018-01-08 RX ADMIN — FAMOTIDINE SCH MG: 20 TABLET, FILM COATED ORAL at 09:28

## 2018-01-08 RX ADMIN — CLONIDINE HYDROCHLORIDE SCH MG: 0.1 TABLET ORAL at 15:00

## 2018-01-08 RX ADMIN — NAPROXEN SCH MG: 500 TABLET ORAL at 09:31

## 2018-01-08 NOTE — NUR
COWS/CIWA DEFERRED AND VITALS REFUSED



Pt laying in bed with eyes closed, COWS/CIWA deferred, to be assessed when pt is awake per 
orders. Vitals refused. Respirations 18, even and unlabored. Safety measures in place. Call 
light within reach. Will continue to monitor.

## 2018-01-08 NOTE — NUR
pt in room, attended to mornig groups, is going to patio bratorrie, aware what is going to 
happen when he is released from program, complient with meds, no a/r noted.

## 2018-01-08 NOTE — NUR
Receivd pt in bd awake,alert,oriented x4, verbally reponsiive,her for vodka 750 ml,heroin 7 
gm and meth 0.5 gm on daily basis last 1/2/18, on seizure and fall precautions,on x5 days 
ivan with ativan and subutex started 1/3/18, no a/r notedfull code reg diet tolerated well, 
encourage to attend group but he declained, no distress at this time.

## 2018-01-08 NOTE — NUR
PRN BENADRYL REASSESSMENT



Pt laying in bed with eyes closed. Respirations 16, even and unlabored. Safety measures in 
place. Call light within reach. Will continue to monitor.

## 2018-01-08 NOTE — NUR
END OF SHIFT



Pt is a 18 y/o male admitted on 01/02/18 for ETOH, opiate and meth dependence. Pt was 
dependent on vodka, heroin IV and meth. Pt is full code, regular diet, NKA and on 
fall/seizure precautions. Pt reports PMH of anxiety and depression, denies hx of seizures. 
Pt is on a 5 day Ativan and 5 day Subutex taper that started on 01/03/18, tolerating well. 
Pt presented with anxiety, intermittent sweats, intermittent headache, restlessness, 
fatigue, dysphoria and anhedonia. Scheduled medications and PRN Benadryl administered, 
effective in S/S of withdrawal AEB COWS 3 CIWA 4 lowered to COWS 2 CIWA 2 during shift. Pt 
slept 5 hours. Intake 1117 ml, void x 2, stool x 0. Safety measures in place. Call light 
within reach. Pts needs have been met. Endorsed to day shift nurse.

## 2018-01-08 NOTE — NUR
pt is in group now, complient with meds, after step to take after dc from program, encourage 
po fluids.

## 2018-01-08 NOTE — NUR
START OF SHIFT



Pt is a 16 y/o male admitted on 01/02/18 for ETOH, opiate and meth dependence. Pt was 
dependent on vodka, heroin IV and meth. Pt is full code, regular diet, NKA and on 
fall/seizure precautions. Pt reports PMH of anxiety and depression, denies hx of seizures. 
Pt finished a 5 day Ativan and Subutex taper and is scheduled to be d/c tomorrow. Pt 
presents with anxiety, intermittent sweats, intermittent headache, restlessness, difficulty 
sleeping, dysphoria and anhedonia. Respirations even and unlabored. Denies N/V/D. Denies A/V 
hallucinations. Denies chest pain or SOB. Medications due. Safety measures in place. Call 
light within reach. Will continue to monitor.

## 2018-01-09 VITALS — DIASTOLIC BLOOD PRESSURE: 86 MMHG | SYSTOLIC BLOOD PRESSURE: 120 MMHG

## 2018-01-09 VITALS — DIASTOLIC BLOOD PRESSURE: 84 MMHG | SYSTOLIC BLOOD PRESSURE: 120 MMHG

## 2018-01-09 RX ADMIN — DICYCLOMINE HYDROCHLORIDE SCH MG: 20 TABLET ORAL at 08:34

## 2018-01-09 RX ADMIN — NAPROXEN SCH MG: 500 TABLET ORAL at 08:34

## 2018-01-09 RX ADMIN — CLONIDINE HYDROCHLORIDE SCH MG: 0.1 TABLET ORAL at 08:35

## 2018-01-09 RX ADMIN — THERA TABS SCH UDTAB: TAB at 08:34

## 2018-01-09 RX ADMIN — BACLOFEN SCH MG: 20 TABLET ORAL at 08:34

## 2018-01-09 RX ADMIN — FAMOTIDINE SCH MG: 20 TABLET, FILM COATED ORAL at 08:34

## 2018-01-09 RX ADMIN — GABAPENTIN SCH MG: 300 CAPSULE ORAL at 08:34

## 2018-01-09 RX ADMIN — Medication SCH MG: at 08:34

## 2018-01-09 RX ADMIN — FOLIC ACID SCH MG: 1 TABLET ORAL at 08:34

## 2018-01-09 NOTE — NUR
START OF SHIFT NOTE

Received report from night nurse, 17 year old male admitted for ETOH/Opioid/Meth withdrawal. 
Patient completed his Ativan/Subutex taper. Per endorsement pt did not receive any PRN, last 
CIWA score was 2,COWS 2, slept for 5 hours. Patient received awake,alert and oriented x4. 
Patient was educated regarding plan of care for the day and medication regimen. Safety 
measures in place. Call light with in reach. Will continue to monitor.

## 2018-01-09 NOTE — NUR
END OF SHIFT



Pt is a 18 y/o male admitted on 01/02/18 for ETOH, opiate and meth dependence. Pt was 
dependent on vodka, heroin IV and meth. Pt is full code, regular diet, NKA and on 
fall/seizure precautions. Pt reports PMH of anxiety and depression, denies hx of seizures. 
Pt finished a 5 day Ativan and Subutex taper and is scheduled to be d/c today. Pt presented 
with anxiety, intermittent sweats, intermittent headache, restlessness, difficulty sleeping, 
dysphoria and anhedonia. Scheduled medications administered, effective in S/S of withdrawal 
as verbalized by pt. No PRNs administered. Last COWS 2 and CIWA 2 at 2000. Pt slept 5 hours. 
Intake 1210 ml, void x 1, stool x 0. Safety measures in place. Call light within reach. Pts 
needs have been met. Endorsed to day shift nurse.

## 2018-01-09 NOTE — NUR
DISCHARGE NOTE

Patient is in stable condition. Vital signs WNL. Patient is alert oriented x4, Skin intact 
warm and dry to touch. Patient denies any SI/HI ideations. All discharge paper work done 
signed and dated. Patient educated about discharge instructions, Pt verbalized 
understanding. Patient 's last COWS/CIWA score was 0. Patient discharged from [facility] on 01/09/18 at 0924. Patient left the building with all of his belongings 
and prescriptions, Patient did not bring any medications with him to the unit. MD has been 
contracted and notified of Pt's discharge.